# Patient Record
Sex: MALE | Race: WHITE
[De-identification: names, ages, dates, MRNs, and addresses within clinical notes are randomized per-mention and may not be internally consistent; named-entity substitution may affect disease eponyms.]

---

## 2020-06-25 ENCOUNTER — HOSPITAL ENCOUNTER (OUTPATIENT)
Dept: HOSPITAL 75 - RAD | Age: 35
End: 2020-06-25
Attending: FAMILY MEDICINE
Payer: SELF-PAY

## 2020-06-25 DIAGNOSIS — M75.52: Primary | ICD-10-CM

## 2020-06-25 PROCEDURE — 73221 MRI JOINT UPR EXTREM W/O DYE: CPT

## 2020-06-25 NOTE — DIAGNOSTIC IMAGING REPORT
MRI LT UPPER EXT JOINT W/O



TECHNIQUE: Multiplanar, multisequence MR imaging of the left

shoulder was performed without contrast.



COMPARISON: None available.



INDICATION: Left shoulder pain.



FINDINGS:



Rotator cuff: The supraspinatus, infraspinatus, teres minor and

subscapularis are all intact. No rotator cuff muscle atrophy or

edema.



Glenoid labrum: By non-arthrogram imaging, the glenoid labrum

appears intact. No para-labral cyst.



Long head of biceps: Long head of biceps is normally positioned

within the bicipital groove. The intracapsular segment is intact.



Bones and cartilage: Humeral head is normal in morphology without

fracture or focal osseous lesion.  No glenohumeral

chondromalacia. The acromioclavicular joint is normal in

alignment without significant degenerative change.



Soft tissues: No glenohumeral joint effusion. No MRI findings to

suggest adhesive capsulitis. There is small amount of fluid in

the subacromion and subdeltoid space suggestive of mild bursitis.



IMPRESSION: 

1. No rotator cuff tear.

2. Long head of biceps is intact.

3. Mild subacromial/subdeltoid bursitis.



Dictated by: 



  Dictated on workstation # [id number]

## 2021-10-13 ENCOUNTER — HOSPITAL ENCOUNTER (EMERGENCY)
Dept: HOSPITAL 75 - ER | Age: 36
Discharge: HOME | End: 2021-10-13
Payer: COMMERCIAL

## 2021-10-13 VITALS — SYSTOLIC BLOOD PRESSURE: 129 MMHG | DIASTOLIC BLOOD PRESSURE: 83 MMHG

## 2021-10-13 VITALS — WEIGHT: 226.41 LBS | HEIGHT: 73.62 IN | BODY MASS INDEX: 29.37 KG/M2

## 2021-10-13 DIAGNOSIS — Z79.52: ICD-10-CM

## 2021-10-13 DIAGNOSIS — R42: Primary | ICD-10-CM

## 2021-10-13 LAB
ALBUMIN SERPL-MCNC: 4.7 GM/DL (ref 3.2–4.5)
ALP SERPL-CCNC: 64 U/L (ref 40–136)
ALT SERPL-CCNC: 34 U/L (ref 0–55)
BASOPHILS # BLD AUTO: 0 10^3/UL (ref 0–0.1)
BASOPHILS NFR BLD AUTO: 0 % (ref 0–10)
BILIRUB SERPL-MCNC: 0.7 MG/DL (ref 0.1–1)
BUN/CREAT SERPL: 16
CALCIUM SERPL-MCNC: 9.7 MG/DL (ref 8.5–10.1)
CHLORIDE SERPL-SCNC: 104 MMOL/L (ref 98–107)
CO2 SERPL-SCNC: 23 MMOL/L (ref 21–32)
CREAT SERPL-MCNC: 0.89 MG/DL (ref 0.6–1.3)
EOSINOPHIL # BLD AUTO: 0.1 10^3/UL (ref 0–0.3)
EOSINOPHIL NFR BLD AUTO: 1 % (ref 0–10)
GFR SERPLBLD BASED ON 1.73 SQ M-ARVRAT: 96 ML/MIN
GLUCOSE SERPL-MCNC: 97 MG/DL (ref 70–105)
HCT VFR BLD CALC: 47 % (ref 40–54)
HGB BLD-MCNC: 15.8 G/DL (ref 13.3–17.7)
LYMPHOCYTES # BLD AUTO: 0.9 10^3/UL (ref 1–4)
LYMPHOCYTES NFR BLD AUTO: 11 % (ref 12–44)
MAGNESIUM SERPL-MCNC: 2.1 MG/DL (ref 1.6–2.4)
MANUAL DIFFERENTIAL PERFORMED BLD QL: NO
MCH RBC QN AUTO: 28 PG (ref 25–34)
MCHC RBC AUTO-ENTMCNC: 34 G/DL (ref 32–36)
MCV RBC AUTO: 83 FL (ref 80–99)
MONOCYTES # BLD AUTO: 0.6 10^3/UL (ref 0–1)
MONOCYTES NFR BLD AUTO: 7 % (ref 0–12)
NEUTROPHILS # BLD AUTO: 6.7 10^3/UL (ref 1.8–7.8)
NEUTROPHILS NFR BLD AUTO: 80 % (ref 42–75)
PLATELET # BLD: 226 10^3/UL (ref 130–400)
PMV BLD AUTO: 10 FL (ref 9–12.2)
POTASSIUM SERPL-SCNC: 4 MMOL/L (ref 3.6–5)
PROT SERPL-MCNC: 8.1 GM/DL (ref 6.4–8.2)
SODIUM SERPL-SCNC: 139 MMOL/L (ref 135–145)
WBC # BLD AUTO: 8.4 10^3/UL (ref 4.3–11)

## 2021-10-13 PROCEDURE — 85025 COMPLETE CBC W/AUTO DIFF WBC: CPT

## 2021-10-13 PROCEDURE — 93005 ELECTROCARDIOGRAM TRACING: CPT

## 2021-10-13 PROCEDURE — 83735 ASSAY OF MAGNESIUM: CPT

## 2021-10-13 PROCEDURE — 84484 ASSAY OF TROPONIN QUANT: CPT

## 2021-10-13 PROCEDURE — 80053 COMPREHEN METABOLIC PANEL: CPT

## 2021-10-13 PROCEDURE — 82947 ASSAY GLUCOSE BLOOD QUANT: CPT

## 2021-10-13 PROCEDURE — 36415 COLL VENOUS BLD VENIPUNCTURE: CPT

## 2021-10-13 PROCEDURE — 70450 CT HEAD/BRAIN W/O DYE: CPT
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
## 2023-03-13 ENCOUNTER — HOSPITAL ENCOUNTER (OUTPATIENT)
Dept: HOSPITAL 75 - RAD | Age: 38
End: 2023-03-13
Attending: FAMILY MEDICINE
Payer: SELF-PAY

## 2023-03-13 DIAGNOSIS — N50.812: Primary | ICD-10-CM

## 2023-03-13 PROCEDURE — 76870 US EXAM SCROTUM: CPT

## 2023-03-13 NOTE — DIAGNOSTIC IMAGING REPORT
INDICATION:  LEFT TESTICULAR PAIN/SWELLING



TECHNIQUE: Real-time grayscale sonographic imaging and color

vascular evaluation of the scrotum. 



CORRELATION STUDY:  None



FINDINGS: 

RIGHT TESTICLE:  5.5 x 2.9 x 3.1 cm. 

LEFT TESTICLE:  4.6 x 2.7 x 3.2 cm. 



The testicles are in normal location and demonstrate homogeneous

echotexture.  There is vascular flow to the testicles.  

Asymmetric heterogeneous echotexture, hypervascularity and

enlargement of the left epididymis consistent with epididymitis. 



Small left-sided hydrocele.



IMPRESSION: 

1.  Findings consistent with left-sided epididymitis along with a

small left-sided hydrocele.



Dictated by: 



  Dictated on workstation # [id number]